# Patient Record
Sex: FEMALE | Race: OTHER
[De-identification: names, ages, dates, MRNs, and addresses within clinical notes are randomized per-mention and may not be internally consistent; named-entity substitution may affect disease eponyms.]

---

## 2017-04-14 ENCOUNTER — HOSPITAL ENCOUNTER (OUTPATIENT)
Dept: HOSPITAL 61 - KCIC MRI | Age: 57
Discharge: HOME | End: 2017-04-14
Attending: NURSE PRACTITIONER
Payer: COMMERCIAL

## 2017-04-14 DIAGNOSIS — M11.051: ICD-10-CM

## 2017-04-14 DIAGNOSIS — M19.011: Primary | ICD-10-CM
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
## 2017-04-14 PROCEDURE — 73221 MRI JOINT UPR EXTREM W/O DYE: CPT

## 2017-04-14 PROCEDURE — 73721 MRI JNT OF LWR EXTRE W/O DYE: CPT

## 2017-04-14 NOTE — [facility]
PROCEDURE 

MR of the right shoulder 

 

HISTORY 

Right shoulder pain. 

 

TECHNIQUE 

Injury 2 years ago. Pain. Routine multiplanar sequences are obtained. 

 

COMPARISON 

 

 

 

FINDINGS 

 

Acromioclavicular joint is mildly degenerative. Mild undersurface 

hypertrophy of the outer clavicle indents the supraspinatus. 

 

There is mild signal and thickening of the rotator cuff compatible with 

tendinosis. Some focal low signal tissue identified at the supraspinatus 

tendon compatible with calcium hydroxyapatite deposition. No evidence of a

rotator cuff tear. Trace subdeltoid bursal fluid. 

No evidence of labral tear or paralabral cyst. No acute articular 

cartilage defect. Biceps tendon is intact. 

No bone lesion or acute fracture. No acute soft tissue injury. 

 

 

IMPRESSION 

1. Calcification at the supraspinatus tendon, compatible with calcific 

tendinitis or calcium hydroxyapatite deposition disease.

2. Rotator cuff tendinosis without evidence of tear.

3. Acromioclavicular joint osteoarthritis with undersurface mass-effect.

 

 

 

Electronically signed by: Leandro Avery MD (Apr 14, 2017 12:12:11)

## 2017-04-14 NOTE — [facility]
PROCEDURE 

MR of the right hip 

 

HISTORY 

Right hip pain. Fell off a ladder 2 years ago. 

 

TECHNIQUE 

Routine multiplanar sequences are obtained. 

 

COMPARISON 

 

 

 

FINDINGS 

 

No bone lesion or acute fracture. No acute bone marrow edema. No femoral 

head osteonecrosis. 

No significant joint effusion. No evidence of a labral tear. No paralabral

cyst. 

Gluteus minimus demonstrates mild tendinosis at the greater trochanteric 

attachment. Right gluteus medius tendon is intact. Hamstring and iliopsoas

tendons are intact. Rectus femoris and sartorius tendon attachments are 

intact. 

Muscle tissue unremarkable. No abnormal soft tissue fluid collection. 

Diagnostically limited large field-of-view coronal survey sequence 

demonstrates no acute findings at the contralateral hip or elsewhere. 

 

IMPRESSION 

1. Mild right gluteus minimus tendinosis. 

2. No other significant abnormality. 

 

Electronically signed by: Leandro Avery MD (Apr 14, 2017 13:00:59)

## 2019-06-11 ENCOUNTER — HOSPITAL ENCOUNTER (EMERGENCY)
Dept: HOSPITAL 35 - ER | Age: 59
Discharge: HOME | End: 2019-06-11
Payer: COMMERCIAL

## 2019-06-11 VITALS — HEIGHT: 66 IN | WEIGHT: 135.01 LBS | BODY MASS INDEX: 21.7 KG/M2

## 2019-06-11 VITALS — SYSTOLIC BLOOD PRESSURE: 159 MMHG | DIASTOLIC BLOOD PRESSURE: 62 MMHG

## 2019-06-11 DIAGNOSIS — M75.31: Primary | ICD-10-CM
